# Patient Record
Sex: FEMALE | Race: WHITE
[De-identification: names, ages, dates, MRNs, and addresses within clinical notes are randomized per-mention and may not be internally consistent; named-entity substitution may affect disease eponyms.]

---

## 2019-01-31 ENCOUNTER — HOSPITAL ENCOUNTER (EMERGENCY)
Dept: HOSPITAL 25 - ED | Age: 29
Discharge: HOME | End: 2019-01-31
Payer: COMMERCIAL

## 2019-01-31 VITALS — SYSTOLIC BLOOD PRESSURE: 131 MMHG | DIASTOLIC BLOOD PRESSURE: 97 MMHG
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
## 2019-01-31 DIAGNOSIS — Z91.041: ICD-10-CM

## 2019-01-31 DIAGNOSIS — S70.02XA: Primary | ICD-10-CM

## 2019-01-31 DIAGNOSIS — S70.01XA: ICD-10-CM

## 2019-01-31 DIAGNOSIS — S00.211A: ICD-10-CM
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
## 2019-01-31 DIAGNOSIS — Z91.030: ICD-10-CM

## 2019-01-31 PROCEDURE — 72170 X-RAY EXAM OF PELVIS: CPT

## 2019-01-31 PROCEDURE — 99282 EMERGENCY DEPT VISIT SF MDM: CPT
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
